# Patient Record
Sex: FEMALE | Race: WHITE | NOT HISPANIC OR LATINO | Employment: UNEMPLOYED | URBAN - METROPOLITAN AREA
[De-identification: names, ages, dates, MRNs, and addresses within clinical notes are randomized per-mention and may not be internally consistent; named-entity substitution may affect disease eponyms.]

---

## 2017-01-03 ENCOUNTER — TELEPHONE (OUTPATIENT)
Dept: UROGYNECOLOGY | Facility: CLINIC | Age: 62
End: 2017-01-03

## 2017-01-03 DIAGNOSIS — N30.00 ACUTE CYSTITIS WITHOUT HEMATURIA: Primary | ICD-10-CM

## 2017-01-03 RX ORDER — SULFAMETHOXAZOLE AND TRIMETHOPRIM 800; 160 MG/1; MG/1
1 TABLET ORAL 2 TIMES DAILY
Qty: 10 TABLET | Refills: 0 | Status: SHIPPED | OUTPATIENT
Start: 2017-01-03 | End: 2017-01-08

## 2017-02-07 ENCOUNTER — TELEPHONE (OUTPATIENT)
Dept: UROGYNECOLOGY | Facility: CLINIC | Age: 62
End: 2017-02-07

## 2017-02-07 NOTE — TELEPHONE ENCOUNTER
----- Message from Kaley Cade sent at 2/7/2017  1:42 PM CST -----  Contact: Self  Pt is calling in regards of seeing if she was needing to set up another appt (pre surgery) or needing to do lab work since her last visit on 12/28/2016. The pt can be reached at [phone]. Thanks KG

## 2017-02-20 ENCOUNTER — PROCEDURE VISIT (OUTPATIENT)
Dept: UROGYNECOLOGY | Facility: CLINIC | Age: 62
End: 2017-02-20
Attending: OBSTETRICS & GYNECOLOGY
Payer: MEDICARE

## 2017-02-20 VITALS
DIASTOLIC BLOOD PRESSURE: 62 MMHG | HEIGHT: 66 IN | BODY MASS INDEX: 27.67 KG/M2 | WEIGHT: 172.19 LBS | SYSTOLIC BLOOD PRESSURE: 126 MMHG

## 2017-02-20 DIAGNOSIS — N39.46 MIXED URGE AND STRESS INCONTINENCE: ICD-10-CM

## 2017-02-20 DIAGNOSIS — R39.15 URINARY URGENCY: ICD-10-CM

## 2017-02-20 LAB
BILIRUB SERPL-MCNC: NORMAL MG/DL
BLOOD URINE, POC: NORMAL
COLOR, POC UA: NORMAL
GLUCOSE UR QL STRIP: NORMAL
KETONES UR QL STRIP: NORMAL
LEUKOCYTE ESTERASE URINE, POC: NORMAL
NITRITE, POC UA: NORMAL
PH, POC UA: 5
PROTEIN, POC: NORMAL
SPECIFIC GRAVITY, POC UA: 1.01
UROBILINOGEN, POC UA: NORMAL

## 2017-02-20 PROCEDURE — 81002 URINALYSIS NONAUTO W/O SCOPE: CPT | Mod: PBBFAC

## 2017-02-20 PROCEDURE — 51797 INTRAABDOMINAL PRESSURE TEST: CPT | Mod: 26,S$GLB,, | Performed by: OBSTETRICS & GYNECOLOGY

## 2017-02-20 PROCEDURE — 51728 CYSTOMETROGRAM W/VP: CPT | Mod: PBBFAC | Performed by: OBSTETRICS & GYNECOLOGY

## 2017-02-20 PROCEDURE — 51728 CYSTOMETROGRAM W/VP: CPT | Mod: 26,S$GLB,, | Performed by: OBSTETRICS & GYNECOLOGY

## 2017-02-20 PROCEDURE — 51741 ELECTRO-UROFLOWMETRY FIRST: CPT | Mod: PBBFAC | Performed by: OBSTETRICS & GYNECOLOGY

## 2017-02-20 PROCEDURE — 52000 CYSTOURETHROSCOPY: CPT | Mod: 59,S$PBB,, | Performed by: OBSTETRICS & GYNECOLOGY

## 2017-02-20 PROCEDURE — 52000 CYSTOURETHROSCOPY: CPT | Mod: PBBFAC | Performed by: OBSTETRICS & GYNECOLOGY

## 2017-02-20 PROCEDURE — 51741 ELECTRO-UROFLOWMETRY FIRST: CPT | Mod: 26,51,S$GLB, | Performed by: OBSTETRICS & GYNECOLOGY

## 2017-02-20 PROCEDURE — 51797 INTRAABDOMINAL PRESSURE TEST: CPT | Mod: PBBFAC | Performed by: OBSTETRICS & GYNECOLOGY

## 2017-02-20 PROCEDURE — 51784 ANAL/URINARY MUSCLE STUDY: CPT | Mod: 26,51,S$GLB, | Performed by: OBSTETRICS & GYNECOLOGY

## 2017-02-20 PROCEDURE — 51784 ANAL/URINARY MUSCLE STUDY: CPT | Mod: PBBFAC | Performed by: OBSTETRICS & GYNECOLOGY

## 2017-02-20 RX ORDER — LIDOCAINE HYDROCHLORIDE 20 MG/ML
JELLY TOPICAL ONCE
Status: COMPLETED | OUTPATIENT
Start: 2017-02-20 | End: 2017-02-20

## 2017-02-20 RX ORDER — CIPROFLOXACIN 500 MG/1
500 TABLET ORAL
Status: COMPLETED | OUTPATIENT
Start: 2017-02-20 | End: 2017-02-20

## 2017-02-20 RX ADMIN — LIDOCAINE HYDROCHLORIDE: 20 JELLY TOPICAL at 12:02

## 2017-02-20 RX ADMIN — CIPROFLOXACIN 500 MG: 500 TABLET ORAL at 12:02

## 2017-02-20 NOTE — MR AVS SNAPSHOT
[facility]  [address] [address]  Phone: [phone]                  Michelle Haley   2017 10:00 AM   Procedure visit    Description:  Female : 1955   Provider:  SUDS, Catholic   Department:  [facility]           Diagnoses this Visit        Comments    Mixed urge and stress incontinence         Urinary urgency                To Do List           Goals (5 Years of Data)     None      [facility]     [facility] Care [facility] -  Assistance  Registered nurses in the [facility] provide clinical advisement, health education, appointment booking, and other advisory services.  Call for this free service at [phone].             Medications           Message regarding Medications     Verify the changes and/or additions to your medication regime listed below are the same as discussed with your clinician today.  If any of these changes or additions are incorrect, please notify your healthcare provider.        These medications were administered today        Dose Freq    lidocaine HCl 2% urojet  Once    Sig: by Mucous Membrane route once.    Class: Normal    Route: Mucous Membrane    ciprofloxacin HCl tablet 500 mg 500 mg Clinic/HOD 1 time    Sig: Take 1 tablet (500 mg total) by mouth one time.    Class: Normal    Route: Oral      STOP taking these medications     tizanidine (ZANAFLEX) 4 MG tablet Take 4 mg by mouth every 6 (six) hours as needed.           Verify that the below list of medications is an accurate representation of the medications you are currently taking.  If none reported, the list may be blank. If incorrect, please contact your healthcare provider. Carry this list with you in case of emergency.           Current Medications     busPIRone (BUSPAR) 10 MG tablet Take 10 mg by mouth 3 (three) times daily.    clonazePAM (KLONOPIN) 0.5 MG tablet Take 0.5 mg by mouth 2 (two) times daily as needed for  "Anxiety.    fish oil-omega-3 fatty acids 300-1,000 mg capsule Take 1 g by mouth.    gabapentin (NEURONTIN) 300 MG capsule Take 300 mg by mouth 3 (three) times daily.    hydrocodone-acetaminophen 7.5-325mg (NORCO) 7.5-325 mg per tablet Take 1 tablet by mouth every 6 (six) hours as needed for Pain.    lisinopril 10 MG tablet Take 1 tablet by mouth.    oxybutynin (DITROPAN-XL) 10 MG 24 hr tablet Take 1 tablet (10 mg total) by mouth once daily.    paroxetine (PAXIL) 20 MG tablet Take 40 mg by mouth.    pravastatin (PRAVACHOL) 40 MG tablet Take 40 mg by mouth once daily.    senna (SENOKOT) 8.6 mg tablet Take 1 tablet by mouth once daily.    estradiol (ESTRACE) 0.01 % (0.1 mg/gram) vaginal cream Place 0.5 g vaginally twice a week. Apply to the vagina daily for two weeks then twice a week.           Clinical Reference Information           Your Vitals Were     BP Height Weight BMI       126/62 (BP Location: Right arm, Patient Position: Sitting, BP Method: Manual) 5' 6" (1.676 m) 78.1 kg (172 lb 2.9 oz) 27.79 kg/m2       Blood Pressure          Most Recent Value    BP  126/62      Allergies as of 2/20/2017     No Known Allergies      Immunizations Administered on Date of Encounter - 2/20/2017     None      Orders Placed During Today's Visit      Normal Orders This Visit    POCT URINE DIPSTICK WITHOUT MICROSCOPE     Simple Urodynamics w/ Cysto          2/20/2017 12:17 PM - Jinny ABAD RN      Component Results     Component    Color, UA    clear yellow    Spec Grav, UA    1.015    pH, UA    5    WBC, UA    neg    Nitrite, UA    neg    Protein, UA    neg    Glucose, UA    normal    Ketones, UA    neg    Urobilinogen, UA    normal    Bilirubin, UA    neg    Blood, UA    neg            Administrations This Visit     ciprofloxacin HCl tablet 500 mg     Admin Date Action Dose Route Administered By             02/20/2017 Given 500 mg Oral Jinny ABAD RN                    lidocaine HCl 2% urojet     Admin Date Action Dose " Route Administered By             02/20/2017 Given   Mucous Membrane Jinny ABAD RN                      [facility] Sign-Up     Activating your [facility] account is as easy as 1-2-3!     1) Visit [email or web address], select Sign Up Now, enter this activation code and your date of birth, then select Next.  [email or web address]  Expires: 4/6/2017 12:17 PM      2) Create a username and password to use when you visit [facility] in the future and select a security question in case you lose your password and select Next.    3) Enter your e-mail address and click Sign Up!    Additional Information  If you have questions, please e-mail [email or web address] or call [phone] to talk to our [facility] staff. Remember, [facility] is NOT to be used for urgent needs. For medical emergencies, dial 911.         Language Assistance Services     ATTENTION: Language assistance services are available, free of charge. Please call [phone].      ATENCIÓN: Si habla español, tiene a tirnidad disposición servicios gratuitos de asistencia lingüística. Llame al [phone].     CHÚ Ý: N?u b?n nói Ti?ng Vi?t, có các d?ch v? h? tr? ngôn ng? mi?n phí dành cho b?n. G?i s? [phone].         [facility] complies with applicable Federal civil rights laws and does not discriminate on the basis of race, color, national origin, age, disability, or sex.

## 2017-02-20 NOTE — PROCEDURES
TITLE OF OPERATION:  1. Complex cystometry.  2. Complex uroflowmetry.  3. Electromyography with surface electrodes.  4. Pressure voiding flow study.  5. Abdominal pressure measurement.  6. Leak point pressure measurement.    INDICATIONS:  61 Y O F with worsening urinary incontinence, she has a history of previous spinal surgery now with worsening leakage with cough when in a lying down position.     PREOPERATIVE DIAGNOSIS:  1. Urinary urgency  2. Urge urinary incontinence    POSTOPERATIVE DIAGNOSIS:  1. Urinary urgency  2. Urge urinary incontinence    ANESTHESIA:  None.    SPECIMEN (BACTERIOLOGICAL, PATHOLOGICAL OR OTHER):  None.    PROSTHETIC DEVICE/IMPLANT:  None.    SURGEONS NARRATIVE:  A time out was performed in which the patient identity and procedure were confirmed.  Urodynamic evaluation was performed using a computerized system ([employer]  Uroflowmetry was performed on the patient in the sitting position without catheters in place.  Subsequent urodynamic testing was performed with the patient in the lithotomy position at 45 degrees. Air charged catheters were used with sterile water as the infusion medium. Vesical and abdominal (rectal) pressures were measured, and detrusor pressure was calculated. EMG activity was recorded with surface electrodes. During filling, room temperature sterile water was infused at a rate of 30 cubic centimeters per minute. The patient was asked cough after instillation of each 100cc volume. Two Valsalva leak point pressures and two cough leak point pressures were performed with the catheters in place at 300 cubic centimeters and again at maximum capacity. Valsalva leak point pressure was defined as the difference between vesical pressure at which leakage was noted (visualized at the external urethral meatus) and the baseline vesical pressure. Following urodynamic testing, a pressure flow study was performed with the patient in the sitting position. Vesical  and abdominal pressures were monitored and detrusor pressures were calculated. After the pressure flow study, the catheters were then removed. The patient tolerated the procedure well.     Urine dipstick: Normal.    1.  VOIDING PHASE:      a.  Uroflowmetry:   Prolapse reduction: No   Voided volume: 86   mL    Voiding time:   27 seconds   Max flow:  6.5 mL/s   Avg flow:   3.1 mL/s    PVR:   50 mL    The overall configuration of this uroflow study was  Interrupted.      b.  Pressure flow:   Prolapse reduction: No   Voided volume:   512  mL   Voiding time:   1:54 seconds   Peak flow:  21 mL/s    Avg flow:  4.4 mL/s   Max det pressure:  25  cm H20   Det pressure at max flow: 21 cm H20   Void initiated by  permission.     Urethral relaxation (EMG):  None      PVR (calculated):  240 mL    The overall configuration of this pressure flow study was normal flow.       2.  FILLING PHASE:   1st desire: 370 mL   Normal desire:  402 mL   Strong desire:  614 mL   Urgency:  750 mL   Compliance (calculated)  3.13 mL/cm H20   EMG activity during filling:   normal   Detrusor contractions observed: No.      3.  URETHRAL FUNCTION/STORAGE PHASE:    a.  WITHOUT prolapse reduction:   CLPP (300 mL): Negative  at  180 cm H20   VLPP (300 mL): Negative  at  95 cm H20    CLPP (MAX ):    Negative  at  148 cm H20   VLPP (MAX):     Negative  at  68 cm H20  b.  WITH prolapse reduction:   CLPP ( mL ):  Positive  at  162 cm H20   VLPP ( mL):    Positive  at  139 cm H20    These findings are consistent with Positive urodynamic stress incontinence at capacity, patient with leakage only while lying down, does not currently use pads during the day. This is more likely a cough induced DI, although we were not able to reproduce this on exam today.     Assessment:  UF  Normal .  PF  No prolapse .  Compliance  Normal .  Max capacity  .  DO (--).  RONEL (+).      Plan:  Patient is a candidiate for a sling, but on  further discussion she is having leakage only while laying down with cough, more likely this is 2/2 to cough induced DI. Plan for increased oxybutynin to 15 mg daily, SE profile discussed.     Date: 2/20/2017    Title of Operation:   Cystourethroscopy.     INDICATIONS:  61 Y O F with worsening urinary incontinence, RONEL leakage noted only 750 mL    PREOPERATIVE DIAGNOSIS  1. Urinary urgency  2. Urge urinary incontinence    POSTOPERATIVE  DIAGNOSIS:   1. Urinary urgency  2. Urge urinary incontinence    Anesthesia:   2% Xylocaine gel.    Specimen (Bacteriological, Pathological or other):   None.     Prosthetic Device/Implant:   None.     Surgeons Narrative:     After informed consent was obtained, the patient was placed in the lithotomy position. The urethral meatus was prepped with Betadine and 10 cubic centimeters of 2% Xylocaine gel were introduced into the urethra. A flexible cystourethroscope was introduced into the bladder. The bladder was distended with approximately 300 cubic centimeters of sterile water. A systematic survey was performed in which the bladder was surveyed using multiple sequential passes in a clockwise fashion from the bladder dome to the bladder base to the urethrovesical junction with deep bladder trabeculations noted. The trigone and ureteral orifices were observed. The scope was then flipped back on itself, and the urethrovesical junction was viewed. A vaginal examining finger was then placed with pressure suburethrally at the urethrovesical junction as the telescope was withdrawn in order to perform positive pressure urethroscopy.  Standard maneuvers of cough, squeeze and Valsalva were performed. The telescope was then completely withdrawn.     Findings: Urethroscopy:  Normal.  Cystoscopy:  Normal bladder mucosa, bilateral ureteral flow was noted.      Assessment: Essentially normal cystourethroscopy, deep trabeculations       Plan: The patient will follow up with Dr. Morataya  as  scheduled.  See urodynamics note from  for further plan details.

## 2017-02-24 RX ORDER — OXYBUTYNIN CHLORIDE 15 MG/1
15 TABLET, EXTENDED RELEASE ORAL DAILY
Qty: 30 TABLET | Refills: 12 | Status: SHIPPED | OUTPATIENT
Start: 2017-02-24 | End: 2017-03-26